# Patient Record
(demographics unavailable — no encounter records)

---

## 2024-10-07 NOTE — PHYSICAL EXAM
[FreeTextEntry1] : Gen: Patient is A&O x 3, NAD HEENT: EOMI, hearing grossly normal Resp: regular, non - labored CV: pulses regular Skin: no rashes, erythema Lymph: +Non-pitting edema in RUE and chest wall, no edema in LUE  Inspection: no instability or misalignment, impaired hand dexterity ROM: full throughout Palpation: TTP bilateral hand A1 pulley digits, triggering noted with finger flexion, TTP right chest wall  Sensation: decreased to light touch in bilateral hands and feet Reflexes: 2+ throughout  Strength: 4/5 strength throughout Special tests: -Ibanez sign Gait: difficulty walking tandem, antalgic, small step length, difficulty with tandem gait

## 2024-10-07 NOTE — HISTORY OF PRESENT ILLNESS
[FreeTextEntry1] : Ms. Wayne is a 60 year old female with history of bilateral breast cancer-right Stage IIB, ER positive/MA positive/HER-2 negative/left Stage IIA ER positive/MA negative/HER-2 positive.  She underwent bilateral mastectomies in October 2020 (1/6 sentinal notes on Right, 0/3 sentinel nodes on left)  S/P AC x 4, with Taxol course complicated by hypersensitivity reaction and uncontrolled DM exacerbated by steroids. S/P Abraxane completed in June 2021. S/P adjuvant radiation to right chest wall and nodes completed September 2021. Continuing with Herceptin/Perjeta, currently on exemestane.  Stopped anticoagulation.     She reports still having joint pains of her body increase duloxetine has helped with this no major side effects.  Continues on gabapentin for chest wall pain and neuropathy symptoms.  Working with physical therapy which has been helpful feels like her lymphedema is improving less tightness in her chest wall.  No new focal weakness.  No side effects to current medications.

## 2024-10-07 NOTE — REVIEW OF SYSTEMS
[Negative] : Psychiatric [FreeTextEntry9] : +Joint pain, +Hand pain, +Right knee pain, +Trigger finger  [de-identified] : +Neuropathy

## 2024-10-07 NOTE — ASSESSMENT
[FreeTextEntry1] : 60 year old female presenting for evaluation.  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, with underlying median neuropathy at the wrist as well -Continue gabapentin 1200mg TID-rx sent  -Medical cannabis previously ineffective   #Aromatase inhibitor induced arthralgias: -Presenting with joint pains in hands and trigger finger -Increase duloxetine to 90mg daily-Rx sent   #Lymphedema in RUE -US RUE reviewed  -Continue pneumatic compression pump daily -Unable to perform bioimpedance spectroscopy of LUE due to bilateral risk, clinically monitor  -Continue MLD -Educated about lymphedema   #Post-mastectomy pain: -Continue PT   #Back pain: -Following with Neurosurgery, surveillance MRIs   Follow up in 3 months or sooner as needed.

## 2024-11-19 NOTE — REVIEW OF SYSTEMS
[Fatigue] : fatigue [Joint Pain] : joint pain [Joint Stiffness] : joint stiffness [Difficulty Walking] : difficulty walking [Negative] : Respiratory [All other systems negative] : All other systems negative [FreeTextEntry9] : knee pain b/l

## 2024-11-19 NOTE — PHYSICAL EXAM
[Alert] : alert [No Proptosis] : no proptosis [No Lid Lag] : no lid lag [No Respiratory Distress] : no respiratory distress [No Accessory Muscle Use] : no accessory muscle use [Clear to Auscultation] : lungs were clear to auscultation bilaterally [Normal Rate] : heart rate was normal [Regular Rhythm] : with a regular rhythm [No Spinal Tenderness] : no spinal tenderness [No Stigmata of Cushings Syndrome] : no stigmata of Cushings Syndrome [Normal Gait] : normal gait [No Clubbing, Cyanosis] : no clubbing  or cyanosis of the fingernails [Abdominal Striae] : no abdominal striae [Acanthosis Nigricans] : no acanthosis nigricans [Foot Ulcers] : no foot ulcers [Acne] : no acne [Hirsutism] : no hirsutism [de-identified] : BMI 60 --> 56 [de-identified] : +difficult to assess thyroid gland [de-identified] : b/l mastectomy.  [de-identified] : small scabbed lesions, pt notes she picked at them.  [de-identified] : +stasis dermatitis changes on anterior shins of b/l LE

## 2024-11-19 NOTE — HISTORY OF PRESENT ILLNESS
[FreeTextEntry1] : Ms. Wayne is presenting for follow up for her diabetes.  Dr. Ariana Díaz, PCP  60 year old female with PMH of infiltrating ductal carcinoma, poorly differentiated ER+, AR negative, HER-2 positive in left breast, infiltrating mammary carcinoma, moderately differentiated with mixed ductal and lobular morphologic features-ER positive, AR positive, HER-2 negative in right breast s/p bilateral mastectomies/axillary lymph node dissections which showed invasive carcinoma in b/l breasts on Oct 2020. She began AC-THP chemotherapy regimen in Dec 2020, had hypersensitivity to taxol. She also has lung blood clots, on eloquis, hypothyroidism, low b12. She has had diabetes for 5-6 years with A1c <7%. She subsequently developed steroid induced hyperglycemia.  POCT HbA1c%: 6.6% Nov 2022 --> 6.9% March 2023 --> 6.8% June 2023 --> 8.0% Nov 2023 --> 6.3% Aug 2024 --> Nov 2024 (canceled)  Current DM meds: Mounjaro 10mg  Prior DM meds: metformin 500mg QD and saxagliptan 5mg QD (onglyza), Ozempic 2.0mg weekly Pertinent: meds: Chemo agents: Herceptin/Perjeta, Abraxane (12 weeks total), extemestane.  FSG: Checks 1-2x Pre-Breakfast: 120s to 130s. Pre-Lunch: Pre-Dinner: Bedtime: Hypoglycemic episodes: none  Diet: Breakfast: cheerios Lunch: Sandwichs with roast beef on roll, taco bell or pizza Dinner: pork chops, chicken cutlets breaded and fried, macaroni with veggies, chicken soup with pasta. Snacks: Fruits apples, bananas or oranges. Pretzels or chips. Denies soda or juice, drinks only seltzer.  Exercise: none Urine micro: Wnl Nov 2022 Lipid profile: TGs 117, LDL 56 March 2023 Statin: Lipitor 10mg HbA1c%: 6.3% Ophthalmology: Jan 2023 No DR Neuropathy: None Podiatry/Diabetic foot exam: 2023  #Hypothyroidism Diagnosed around 2010 LT4 112mcg QD  Aug 2024 TSH 4.17  #Osteoporosis Pt is on Exemestane 25mg QD for 3 out of 7 years total. Pt has h/o breast cancer s/p double mastectomy, chemo and 1 month of radiation.  Fracture history: None Family history: No parental history of hip fracture. Mother with arthritis s/p hip replacement. Prior Treatment: None Current treatment: Reclast - first dose received end of Jan 2024. Tolerated well.  Prior HRT: contraindicated. Allergies: sulfur  Bone History Menopause: Age DEXA Aug 2021 Wnl, no OP. DEXA Sept 2023: T-scores of Spine -2.9, Left hip -1.2, Total hip 0.8.  Falls: No Height loss: Minimal <1 inch. Kidney stones: No Dental health: Regular appointments, no history of implants, no upcoming procedures planned. Possible cavity. Exercise: Dairy intake: Minimal. Calcium supplements: 1200mg daily. Multivitamin: Yes Vitamin D supplements: Mixed in with  Osteoporosis risk factors include: Postmenopausal status,  race, prior fracture, falls, height loss, small thin bones, tobacco use, malignancy, radiation treatment, excessive alcohol, anorexia, family history, vitamin D deficiency, long term corticosteroid use (>3 months), seizure medications (ie phenytoin), prolonged amenorrhea, eating disorders, malabsorption, hyperparathyroidism, hyperthyroidism. NEGATIVE EXCEPT: Postmenopausal status,  race, malignancy, radiation treatment.  Interval hx: Pt on Mounjaro 10mg. Does not have bowl movement every day. Takes oral fiber daily. Now not drinking enough water due to her father in [facility].  Pt weighed 356lbs --> 320 lbs (weighed in office) Reclast - first dose received Jan 2024, tolerated well.  Above information updated as needed.

## 2024-11-19 NOTE — HISTORY OF PRESENT ILLNESS
[FreeTextEntry1] : Ms. Wayne is presenting for follow up for her diabetes.  Dr. Ariana Díaz, PCP  60 year old female with PMH of infiltrating ductal carcinoma, poorly differentiated ER+, NH negative, HER-2 positive in left breast, infiltrating mammary carcinoma, moderately differentiated with mixed ductal and lobular morphologic features-ER positive, NH positive, HER-2 negative in right breast s/p bilateral mastectomies/axillary lymph node dissections which showed invasive carcinoma in b/l breasts on Oct 2020. She began AC-THP chemotherapy regimen in Dec 2020, had hypersensitivity to taxol. She also has lung blood clots, on eloquis, hypothyroidism, low b12. She has had diabetes for 5-6 years with A1c <7%. She subsequently developed steroid induced hyperglycemia.  POCT HbA1c%: 6.6% Nov 2022 --> 6.9% March 2023 --> 6.8% June 2023 --> 8.0% Nov 2023 --> 6.3% Aug 2024 --> Nov 2024 (canceled)  Current DM meds: Mounjaro 10mg  Prior DM meds: metformin 500mg QD and saxagliptan 5mg QD (onglyza), Ozempic 2.0mg weekly Pertinent: meds: Chemo agents: Herceptin/Perjeta, Abraxane (12 weeks total), extemestane.  FSG: Checks 1-2x Pre-Breakfast: 120s to 130s. Pre-Lunch: Pre-Dinner: Bedtime: Hypoglycemic episodes: none  Diet: Breakfast: cheerios Lunch: Sandwichs with roast beef on roll, taco bell or pizza Dinner: pork chops, chicken cutlets breaded and fried, macaroni with veggies, chicken soup with pasta. Snacks: Fruits apples, bananas or oranges. Pretzels or chips. Denies soda or juice, drinks only seltzer.  Exercise: none Urine micro: Wnl Nov 2022 Lipid profile: TGs 117, LDL 56 March 2023 Statin: Lipitor 10mg HbA1c%: 6.3% Ophthalmology: Jan 2023 No DR Neuropathy: None Podiatry/Diabetic foot exam: 2023  #Hypothyroidism Diagnosed around 2010 LT4 112mcg QD  Aug 2024 TSH 4.17  #Osteoporosis Pt is on Exemestane 25mg QD for 3 out of 7 years total. Pt has h/o breast cancer s/p double mastectomy, chemo and 1 month of radiation.  Fracture history: None Family history: No parental history of hip fracture. Mother with arthritis s/p hip replacement. Prior Treatment: None Current treatment: Reclast - first dose received end of Jan 2024. Tolerated well.  Prior HRT: contraindicated. Allergies: sulfur  Bone History Menopause: Age DEXA Aug 2021 Wnl, no OP. DEXA Sept 2023: T-scores of Spine -2.9, Left hip -1.2, Total hip 0.8.  Falls: No Height loss: Minimal <1 inch. Kidney stones: No Dental health: Regular appointments, no history of implants, no upcoming procedures planned. Possible cavity. Exercise: Dairy intake: Minimal. Calcium supplements: 1200mg daily. Multivitamin: Yes Vitamin D supplements: Mixed in with  Osteoporosis risk factors include: Postmenopausal status,  race, prior fracture, falls, height loss, small thin bones, tobacco use, malignancy, radiation treatment, excessive alcohol, anorexia, family history, vitamin D deficiency, long term corticosteroid use (>3 months), seizure medications (ie phenytoin), prolonged amenorrhea, eating disorders, malabsorption, hyperparathyroidism, hyperthyroidism. NEGATIVE EXCEPT: Postmenopausal status,  race, malignancy, radiation treatment.  Interval hx: Pt on Mounjaro 10mg. Does not have bowl movement every day. Takes oral fiber daily. Now not drinking enough water due to her father in [facility].  Pt weighed 356lbs --> 320 lbs (weighed in office) Reclast - first dose received Jan 2024, tolerated well.  Above information updated as needed.

## 2024-11-19 NOTE — REASON FOR VISIT
[Follow - Up] : a follow-up visit [DM Type 2] : DM Type 2 [Hypothyroidism] : hypothyroidism [Osteoporosis] : osteoporosis [FreeTextEntry2] : Dr. Ariana Díaz, PCP, [facility]

## 2024-11-19 NOTE — PHYSICAL EXAM
[Alert] : alert [No Proptosis] : no proptosis [No Lid Lag] : no lid lag [No Respiratory Distress] : no respiratory distress [No Accessory Muscle Use] : no accessory muscle use [Clear to Auscultation] : lungs were clear to auscultation bilaterally [Normal Rate] : heart rate was normal [Regular Rhythm] : with a regular rhythm [No Spinal Tenderness] : no spinal tenderness [No Stigmata of Cushings Syndrome] : no stigmata of Cushings Syndrome [Normal Gait] : normal gait [No Clubbing, Cyanosis] : no clubbing  or cyanosis of the fingernails [Abdominal Striae] : no abdominal striae [Acanthosis Nigricans] : no acanthosis nigricans [Foot Ulcers] : no foot ulcers [Acne] : no acne [Hirsutism] : no hirsutism [de-identified] : BMI 60 --> 56 [de-identified] : +difficult to assess thyroid gland [de-identified] : b/l mastectomy.  [de-identified] : small scabbed lesions, pt notes she picked at them.  [de-identified] : +stasis dermatitis changes on anterior shins of b/l LE

## 2024-11-19 NOTE — ASSESSMENT
[Diabetes Foot Care] : diabetes foot care [Carbohydrate Consistent Diet] : carbohydrate consistent diet [Importance of Diet and Exercise] : importance of diet and exercise to improve glycemic control, achieve weight loss and improve cardiovascular health [Exercise/Effect on Glucose] : exercise/effect on glucose [Action and use of Insulin] : action and use of short and long-acting insulin [Self Monitoring of Blood Glucose] : self monitoring of blood glucose [Levothyroxine] : The patient was instructed to take Levothyroxine on an empty stomach, separate from vitamins, and wait at least 30 minutes before eating [FreeTextEntry1] : 60 year old female with medical history of b/l breast cancer currently undergoing treatment, lung blood clots (on eloquis), low b12, HTN, BMI 63 presenting for steroid induced hyperglycemia f/u for diabetes and hypothyroidism.  1. Type 2 Diabetes, HbA1c 6.6% Nov 2022 --> 6.9% March 2023 --> 6.8% June 2023 --> 8.0% Nov 2023 --> 6.3% Aug 2024 --> Nov 2024 (canceled) -Continue Mounjaro 10mg weekly for 4 weeks, then increase to 12.5mg weekly. Pt denies h/o MTC, pancreatitis, FH of MEN2 syndrome. No h/o MTC, MEN2 or pancreatitis. GI s/e discussed. -Opthal UTD -Microalbumin urine UTD -Continue lipitor 10mg HS -Check SMBG at least 1x daily  2. Osteoporosis She has no history of fragility fracture. Pt received first Reclast dose received Jan 2024.  Calcium 1200 mg daily from diet and supplements (to be taken in divided doses as no more than 500-600 mg can be absorbed at one time); advised increased dietary and/or supplemental calcium Vitamin D supplementation to 2000 intl units daily Diet, weight bearing exercises and fall prevention discussed. Repeat bone density scan Jan 2025 to determine further treatment.   3. Hypothyroidism Continue current LT4 112mcg QD  TSH 4.17 Aug 2024  Patient verbalized understanding of the above. All questions were answered to patient's satisfaction. Dispo: Patient will follow up in 3-4 months.

## 2025-01-06 NOTE — REVIEW OF SYSTEMS
[Negative] : Psychiatric [FreeTextEntry9] : +Joint pain, +Hand pain, +Trigger finger  [de-identified] : +Neuropathy

## 2025-01-06 NOTE — ASSESSMENT
[FreeTextEntry1] : 60 year old female presenting for evaluation.  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, with underlying median neuropathy at the wrist as well -Continue gabapentin 1200mg TID-rx sent  -Medical cannabis previously ineffective   #Aromatase inhibitor induced arthralgias: -Presenting with joint pains in hands and trigger finger -Continue duloxetine 90mg daily-Rx sent   #Right lateral epicondylitis: -Discussed imaging, she would like to defer -Start voltaren gel   #Lymphedema in RUE -Continue pneumatic compression pump daily -Unable to perform bioimpedance spectroscopy of LUE due to bilateral risk, clinically monitor  -Continue self-MLD -Educated about lymphedema   #Back pain: -Following with Neurosurgery, surveillance MRIs   Follow up in 3 months or sooner as needed.

## 2025-01-06 NOTE — HISTORY OF PRESENT ILLNESS
[FreeTextEntry1] : Ms. Wayne is a 60 year old female with history of bilateral breast cancer-right Stage IIB, ER positive/ND positive/HER-2 negative/left Stage IIA ER positive/ND negative/HER-2 positive.  She underwent bilateral mastectomies in October 2020 (1/6 sentinal notes on Right, 0/3 sentinel nodes on left)  S/P AC x 4, with Taxol course complicated by hypersensitivity reaction and uncontrolled DM exacerbated by steroids. S/P Abraxane completed in June 2021. S/P adjuvant radiation to right chest wall and nodes completed September 2021. Continuing with Herceptin/Perjeta, currently on exemestane.  Stopped anticoagulation.    She reports that increasing her duloxetine has been helpful.  She states that the neuropathy symptoms in her feet especially the discomfort has improved.  Still feels numbness.  Still feels joint pains especially in her hands as well as neuropathy symptoms continues on gabapentin.  No new focal weakness.  Reports lymphedema is been stable.  Reports pain in her lateral epicondyle of her right arm.  No recent trauma.  No erythema or increased warmth.

## 2025-01-06 NOTE — PHYSICAL EXAM
[FreeTextEntry1] : Gen: Patient is A&O x 3, NAD HEENT: EOMI, hearing grossly normal Resp: regular, non - labored CV: pulses regular Skin: no rashes, erythema Lymph: +Non-pitting edema in RUE and chest wall, no edema in LUE  Inspection: no instability or misalignment, impaired hand dexterity ROM: full throughout Palpation: TTP bilateral hand A1 pulley digits, triggering noted with finger flexion, TTP right chest wall, TTP right lateral epicondule  Sensation: decreased to light touch in bilateral hands and feet Reflexes: 2+ throughout  Strength: 4/5 strength throughout Special tests: -Ibanez sign, +Right cozens sign  Gait: difficulty walking tandem, antalgic, small step length, difficulty with tandem gait

## 2025-03-11 NOTE — PHYSICAL EXAM
[Restricted in physically strenuous activity but ambulatory and able to carry out work of a light or sedentary nature] : Status 1- Restricted in physically strenuous activity but ambulatory and able to carry out work of a light or sedentary nature, e.g., light house work, office work [Obese] : obese [Normal] : affect appropriate [de-identified] : non-toxic appearing [de-identified] : chronic venous stasis changes LE; no calf tenderness [de-identified] : no CW nodularity appreciated [de-identified] : no axillary LN's palpable [de-identified] : scattered healed skin lesion; no purulence or vesicles [de-identified] : alert and oriented x 3

## 2025-03-11 NOTE — ASSESSMENT
[FreeTextEntry1] : Lab work reviewed. #1) Patient with bilateral breast cancer-right Stage IIB, ER positive/MS positive/HER-2 negative/left Stage IIA ER positive/MS negative/HER-2 positive.  10/22/2020-Patient underwent bilateral mastectomies/axillary lymph node dissections. 12/21/20-Began AC-THP chemotherapy regimen. +Hypersensitivity reaction to taxol, uncontrolled DM, so changed to Abraxane for taxane portion of treatment regimen-completing 6/30/21-->RT completed 9/1/21-->Anastrozole begun 9/2021-discontinued 12/2021 due to severe arthralgias, and began Exemestane-appears to be tolerating thus far. 2/2/22-Completed last Herceptin/Perjeta dose (received 1 year). Patient sees cardiologist (Dr. Bakari Johnson) for cardiac monitoring. --clinically ARLENE  #2) [id number]. --patient under care of endocrine MD for this-receiving reclast.  #3) h/o pulmonary embolism. Patient received anticoagulation for ~12 months.  Initial event ?secondary to body habitus and relative immobility at that time, and was receiving chemotherapy.  Lower extremity venous duplex study 12/2021 unremarkable, as was f/u CTA chest 1/2022-anticoagulation discontinued. --follow clinically  #4) vascular access-had d/w patient having port removed. Patient wishes for it to remain in at this time. She is agreeable to its maintenance.  #5) potassium supplement as needed for h/o low potassium level.  Patient was given the opportunity to ask questions.  Her questions have been answered to her apparent satisfaction.    -->Exemestane 25 mg PO daily; POF/labs, POF Q 2 months. MD visit 6 months.

## 2025-03-11 NOTE — CONSULT LETTER
[Dear  ___] : Dear  [unfilled], [Courtesy Letter:] : I had the pleasure of seeing your patient, [unfilled], in my office today. [Please see my note below.] : Please see my note below. [Consult Closing:] : Thank you very much for allowing me to participate in the care of this patient.  If you have any questions, please do not hesitate to contact me. [Sincerely,] : Sincerely, [FreeTextEntry3] : Kathrine Fountain MD

## 2025-03-11 NOTE — PHYSICAL EXAM
[Restricted in physically strenuous activity but ambulatory and able to carry out work of a light or sedentary nature] : Status 1- Restricted in physically strenuous activity but ambulatory and able to carry out work of a light or sedentary nature, e.g., light house work, office work [Obese] : obese [Normal] : affect appropriate [de-identified] : non-toxic appearing [de-identified] : chronic venous stasis changes LE; no calf tenderness [de-identified] : no CW nodularity appreciated [de-identified] : no axillary LN's palpable [de-identified] : scattered healed skin lesion; no purulence or vesicles [de-identified] : alert and oriented x 3

## 2025-03-11 NOTE — REVIEW OF SYSTEMS
[SOB on Exertion] : shortness of breath during exertion [Joint Pain] : joint pain [Negative] : Allergic/Immunologic [Fainting] : no fainting

## 2025-03-11 NOTE — HISTORY OF PRESENT ILLNESS
[Disease: _____________________] : Disease: [unfilled] [de-identified] : 7/2020-Patient presented with a couple of month history of feeling an "abnormality" in her left breast.  Her last mammogram had been in 2018.  7/29/2020-Mammogram revealed 2 adjacent masses in the left breast at the site of palpable concern.  A 1.2 cm hypoechoic area with distortion in the right breast.   8/7/2020-Ultrasound-guided left breast biopsy revealed infiltrating ductal carcinoma, poorly differentiated-ER +51 to 60% moderate intensity of staining, PA negative, HER-2 positive (3+).  Ultrasound-guided right breast biopsy showed infiltrating mammary carcinoma, moderately differentiated with mixed ductal and lobular morphologic features-ER positive (91 to 100% strong intensity of staining), PA positive (91 to 100% strong intensity of staining), HER-2 negative (1+).  10/22/2020-Patient underwent bilateral mastectomies/axillary lymph node dissections with pathology as follows: Right breast-2.5 cm invasive carcinoma with ductal and lobular features.  1/36 lymph nodes positive for macro metastasis (no extranodal extension).  Margins negative.  No skin or nipple involvement. Left breast-4 cm invasive ductal carcinoma (with DCIS as well).  Margins negative.  No skin or nipple involvement.  3 sentinel lymph nodes negative.  12/21/20-Began AC-THP chemotherapy regimen. +Hypersensitivity reaction to taxol, uncontrolled DM, so changed to Abraxane for taxane portion of treatment regimen-completing 6/30/21-->RT completed 9/1/21-->Anastrozole begun 9/2021-discontinued 12/2021 due to severe arthralgias, and began Exemestane. 2/2/22-Completed last Herceptin/Perjeta dose (received 1 year).      [de-identified] : Right-pT2N1a (Stage IIB)-ductal and lobular features/Left-pT2N0 (Stage IIA)-invasive ductal carcinoma [de-identified] : [employer] genetic testing negative (9 genes)-included BRCA1/BRCA2.\par  Right breast cancer-ER+/MN+/Her 2-.\par  Left breast cancer-ER+/MN-/Her2+.\par  \par  2/2021-PE-treated with LMWH-->Eliquis x ~1 year. [de-identified] : 97 yo father passed away a few weeks ago-condolences offered-patient coping.. Gets reclast from endocrinologist for osteoporosis. Taking moungero. No c/o vaginal bleeding. No c/o CP or palpitations. No current SOB.  No c/o fevers, chills, night sweats, cough, bleeding. No abdominal/pelvic pain. No c/o H/A. Bought a condo in [place]-plans to spend some of summer there.  Sister Amisha present.

## 2025-03-11 NOTE — ASSESSMENT
[FreeTextEntry1] : Lab work reviewed. #1) Patient with bilateral breast cancer-right Stage IIB, ER positive/NJ positive/HER-2 negative/left Stage IIA ER positive/NJ negative/HER-2 positive.  10/22/2020-Patient underwent bilateral mastectomies/axillary lymph node dissections. 12/21/20-Began AC-THP chemotherapy regimen. +Hypersensitivity reaction to taxol, uncontrolled DM, so changed to Abraxane for taxane portion of treatment regimen-completing 6/30/21-->RT completed 9/1/21-->Anastrozole begun 9/2021-discontinued 12/2021 due to severe arthralgias, and began Exemestane-appears to be tolerating thus far. 2/2/22-Completed last Herceptin/Perjeta dose (received 1 year). Patient sees cardiologist (Dr. Bakari Johnson) for cardiac monitoring. --clinically ARLENE  #2) [id number]. --patient under care of endocrine MD for this-receiving reclast.  #3) h/o pulmonary embolism. Patient received anticoagulation for ~12 months.  Initial event ?secondary to body habitus and relative immobility at that time, and was receiving chemotherapy.  Lower extremity venous duplex study 12/2021 unremarkable, as was f/u CTA chest 1/2022-anticoagulation discontinued. --follow clinically  #4) vascular access-had d/w patient having port removed. Patient wishes for it to remain in at this time. She is agreeable to its maintenance.  #5) potassium supplement as needed for h/o low potassium level.  Patient was given the opportunity to ask questions.  Her questions have been answered to her apparent satisfaction.    -->Exemestane 25 mg PO daily; POF/labs, POF Q 2 months. MD visit 6 months.

## 2025-03-11 NOTE — HISTORY OF PRESENT ILLNESS
[Disease: _____________________] : Disease: [unfilled] [de-identified] : 7/2020-Patient presented with a couple of month history of feeling an "abnormality" in her left breast.  Her last mammogram had been in 2018.  7/29/2020-Mammogram revealed 2 adjacent masses in the left breast at the site of palpable concern.  A 1.2 cm hypoechoic area with distortion in the right breast.   8/7/2020-Ultrasound-guided left breast biopsy revealed infiltrating ductal carcinoma, poorly differentiated-ER +51 to 60% moderate intensity of staining, DE negative, HER-2 positive (3+).  Ultrasound-guided right breast biopsy showed infiltrating mammary carcinoma, moderately differentiated with mixed ductal and lobular morphologic features-ER positive (91 to 100% strong intensity of staining), DE positive (91 to 100% strong intensity of staining), HER-2 negative (1+).  10/22/2020-Patient underwent bilateral mastectomies/axillary lymph node dissections with pathology as follows: Right breast-2.5 cm invasive carcinoma with ductal and lobular features.  1/36 lymph nodes positive for macro metastasis (no extranodal extension).  Margins negative.  No skin or nipple involvement. Left breast-4 cm invasive ductal carcinoma (with DCIS as well).  Margins negative.  No skin or nipple involvement.  3 sentinel lymph nodes negative.  12/21/20-Began AC-THP chemotherapy regimen. +Hypersensitivity reaction to taxol, uncontrolled DM, so changed to Abraxane for taxane portion of treatment regimen-completing 6/30/21-->RT completed 9/1/21-->Anastrozole begun 9/2021-discontinued 12/2021 due to severe arthralgias, and began Exemestane. 2/2/22-Completed last Herceptin/Perjeta dose (received 1 year).      [de-identified] : Right-pT2N1a (Stage IIB)-ductal and lobular features/Left-pT2N0 (Stage IIA)-invasive ductal carcinoma [de-identified] : [employer] genetic testing negative (9 genes)-included BRCA1/BRCA2.\par  Right breast cancer-ER+/MI+/Her 2-.\par  Left breast cancer-ER+/MI-/Her2+.\par  \par  2/2021-PE-treated with LMWH-->Eliquis x ~1 year. [de-identified] : 97 yo father passed away a few weeks ago-condolences offered-patient coping.. Gets reclast from endocrinologist for osteoporosis. Taking moungero. No c/o vaginal bleeding. No c/o CP or palpitations. No current SOB.  No c/o fevers, chills, night sweats, cough, bleeding. No abdominal/pelvic pain. No c/o H/A. Bought a condo in [place]-plans to spend some of summer there.  Sister Amisha present.

## 2025-03-11 NOTE — HISTORY OF PRESENT ILLNESS
[Disease: _____________________] : Disease: [unfilled] [de-identified] : 7/2020-Patient presented with a couple of month history of feeling an "abnormality" in her left breast.  Her last mammogram had been in 2018.  7/29/2020-Mammogram revealed 2 adjacent masses in the left breast at the site of palpable concern.  A 1.2 cm hypoechoic area with distortion in the right breast.   8/7/2020-Ultrasound-guided left breast biopsy revealed infiltrating ductal carcinoma, poorly differentiated-ER +51 to 60% moderate intensity of staining, CO negative, HER-2 positive (3+).  Ultrasound-guided right breast biopsy showed infiltrating mammary carcinoma, moderately differentiated with mixed ductal and lobular morphologic features-ER positive (91 to 100% strong intensity of staining), CO positive (91 to 100% strong intensity of staining), HER-2 negative (1+).  10/22/2020-Patient underwent bilateral mastectomies/axillary lymph node dissections with pathology as follows: Right breast-2.5 cm invasive carcinoma with ductal and lobular features.  1/36 lymph nodes positive for macro metastasis (no extranodal extension).  Margins negative.  No skin or nipple involvement. Left breast-4 cm invasive ductal carcinoma (with DCIS as well).  Margins negative.  No skin or nipple involvement.  3 sentinel lymph nodes negative.  12/21/20-Began AC-THP chemotherapy regimen. +Hypersensitivity reaction to taxol, uncontrolled DM, so changed to Abraxane for taxane portion of treatment regimen-completing 6/30/21-->RT completed 9/1/21-->Anastrozole begun 9/2021-discontinued 12/2021 due to severe arthralgias, and began Exemestane. 2/2/22-Completed last Herceptin/Perjeta dose (received 1 year).      [de-identified] : Right-pT2N1a (Stage IIB)-ductal and lobular features/Left-pT2N0 (Stage IIA)-invasive ductal carcinoma [de-identified] : [employer] genetic testing negative (9 genes)-included BRCA1/BRCA2.\par  Right breast cancer-ER+/MO+/Her 2-.\par  Left breast cancer-ER+/MO-/Her2+.\par  \par  2/2021-PE-treated with LMWH-->Eliquis x ~1 year. [de-identified] : 97 yo father passed away a few weeks ago-condolences offered-patient coping.. Gets reclast from endocrinologist for osteoporosis. Taking moungero. No c/o vaginal bleeding. No c/o CP or palpitations. No current SOB.  No c/o fevers, chills, night sweats, cough, bleeding. No abdominal/pelvic pain. No c/o H/A. Bought a condo in [place]-plans to spend some of summer there.  Sister Amisha present.

## 2025-03-11 NOTE — ASSESSMENT
[FreeTextEntry1] : Lab work reviewed. #1) Patient with bilateral breast cancer-right Stage IIB, ER positive/OH positive/HER-2 negative/left Stage IIA ER positive/OH negative/HER-2 positive.  10/22/2020-Patient underwent bilateral mastectomies/axillary lymph node dissections. 12/21/20-Began AC-THP chemotherapy regimen. +Hypersensitivity reaction to taxol, uncontrolled DM, so changed to Abraxane for taxane portion of treatment regimen-completing 6/30/21-->RT completed 9/1/21-->Anastrozole begun 9/2021-discontinued 12/2021 due to severe arthralgias, and began Exemestane-appears to be tolerating thus far. 2/2/22-Completed last Herceptin/Perjeta dose (received 1 year). Patient sees cardiologist (Dr. Bakari Johnson) for cardiac monitoring. --clinically ARLENE  #2) [id number]. --patient under care of endocrine MD for this-receiving reclast.  #3) h/o pulmonary embolism. Patient received anticoagulation for ~12 months.  Initial event ?secondary to body habitus and relative immobility at that time, and was receiving chemotherapy.  Lower extremity venous duplex study 12/2021 unremarkable, as was f/u CTA chest 1/2022-anticoagulation discontinued. --follow clinically  #4) vascular access-had d/w patient having port removed. Patient wishes for it to remain in at this time. She is agreeable to its maintenance.  #5) potassium supplement as needed for h/o low potassium level.  Patient was given the opportunity to ask questions.  Her questions have been answered to her apparent satisfaction.    -->Exemestane 25 mg PO daily; POF/labs, POF Q 2 months. MD visit 6 months.

## 2025-03-11 NOTE — PHYSICAL EXAM
[Restricted in physically strenuous activity but ambulatory and able to carry out work of a light or sedentary nature] : Status 1- Restricted in physically strenuous activity but ambulatory and able to carry out work of a light or sedentary nature, e.g., light house work, office work [Obese] : obese [Normal] : affect appropriate [de-identified] : non-toxic appearing [de-identified] : chronic venous stasis changes LE; no calf tenderness [de-identified] : no CW nodularity appreciated [de-identified] : no axillary LN's palpable [de-identified] : scattered healed skin lesion; no purulence or vesicles [de-identified] : alert and oriented x 3

## 2025-03-14 NOTE — REASON FOR VISIT
[Follow - Up] : a follow-up visit [DM Type 2] : DM Type 2 [Hypothyroidism] : hypothyroidism [Osteoporosis] : osteoporosis [Family Member] : family member [FreeTextEntry2] : Dr. Ariana Díaz, PCP, [facility]

## 2025-03-14 NOTE — HISTORY OF PRESENT ILLNESS
[FreeTextEntry1] : Ms. Wayne is presenting for follow up for her diabetes, osteoporosis.  Dr. Ariana Díaz, PCP  60 year old female with PMH of infiltrating ductal carcinoma, poorly differentiated ER+, NV negative, HER-2 positive in left breast, infiltrating mammary carcinoma, moderately differentiated with mixed ductal and lobular morphologic features-ER positive, NV positive, HER-2 negative in right breast s/p bilateral mastectomies/axillary lymph node dissections which showed invasive carcinoma in b/l breasts on Oct 2020. She began AC-THP chemotherapy regimen in Dec 2020, had hypersensitivity to taxol. She also has lung blood clots, on eloquis, hypothyroidism, low b12. She has had diabetes for 5-6 years with A1c <7%. She subsequently developed steroid induced hyperglycemia.  POCT HbA1c%: 6.9% March 2023 --> 6.8% June 2023 --> 8.0% Nov 2023 --> 6.3% Aug 2024 --> 6.2% Feb 2025  Current DM meds: Mounjaro 12.5mg (tolerating well) Prior DM meds: metformin 500mg QD and saxagliptan 5mg QD (onglyza), Ozempic 2.0mg weekly Pertinent: meds: Chemo agents: Herceptin/Perjeta, Abraxane (12 weeks total), extemestane.  FSG: Does not check  Diet: Breakfast: cheerios Lunch: Sandwichs with roast beef on roll, taco bell or pizza Dinner: pork chops, chicken cutlets breaded and fried, macaroni with veggies, chicken soup with pasta. Snacks: Fruits apples, bananas or oranges. Pretzels or chips. Denies soda or juice, drinks only seltzer.  Exercise: none Urine micro: Wnl Nov 2022 Lipid profile: TGs 117, LDL 56 March 2023 Statin: Lipitor 10mg HbA1c%: 6.3% Ophthalmology: Jan 2023 No  Neuropathy: None Podiatry/Diabetic foot exam: 2023  #Hypothyroidism Diagnosed around 2010 LT4 112mcg QD  Aug 2024 TSH 4.17  #Osteoporosis Pt is on Exemestane 25mg QD for 3 out of 7 years total. Pt has h/o breast cancer s/p double mastectomy, chemo and 1 month of radiation.  Fracture history: None Family history: No parental history of hip fracture. Mother with arthritis s/p hip replacement. Prior Treatment: None Current treatment: Reclast - first dose received end of Jan 2024. Tolerated well.  Prior HRT: contraindicated. Allergies: sulfur  Bone History Menopause: Age DEXA Aug 2021 Wnl, no OP. DEXA Sept 2023: T-scores of Spine -2.9, -1.2 femoral neck, Left hip -1.2, Total hip 0.8. DEXA Feb 2025: T-scores of Spine -0.9, -1.7 femoral neck, Total hip 0.6, -0.6 Left forearm. FRAX 10 year fracture risk calculator the patient's ten-year risk of any fracture is 11% and the patient's risk of hip fracture  is  1.1%  Falls: No Height loss: Minimal <1 inch. Kidney stones: No Dental health: Regular appointments, no history of implants, no upcoming procedures planned. Possible cavity. Exercise: Minimal  Dairy intake: Minimal. Calcium supplements: 1200mg daily. Multivitamin: Yes Vitamin D supplements: Mixed in with  Osteoporosis risk factors include: Postmenopausal status,  race, prior fracture, falls, height loss, small thin bones, tobacco use, malignancy, radiation treatment, excessive alcohol, anorexia, family history, vitamin D deficiency, long term corticosteroid use (>3 months), seizure medications (ie phenytoin), prolonged amenorrhea, eating disorders, malabsorption, hyperparathyroidism, hyperthyroidism. NEGATIVE EXCEPT: Postmenopausal status,  race, malignancy, radiation treatment.  Interval hx: Pt on Mounjaro 12.5mg. Now has bowl movement every day. Takes oral fiber daily. Pt weighed 356lbs --> 320 lbs --> 310lbs (weighed in office) Bone density reviewed - now osteopenia after 1 dose of reclast.  Above information updated as needed.

## 2025-03-14 NOTE — ASSESSMENT
[Diabetes Foot Care] : diabetes foot care [Carbohydrate Consistent Diet] : carbohydrate consistent diet [Importance of Diet and Exercise] : importance of diet and exercise to improve glycemic control, achieve weight loss and improve cardiovascular health [Exercise/Effect on Glucose] : exercise/effect on glucose [Action and use of Insulin] : action and use of short and long-acting insulin [Self Monitoring of Blood Glucose] : self monitoring of blood glucose [Levothyroxine] : The patient was instructed to take Levothyroxine on an empty stomach, separate from vitamins, and wait at least 30 minutes before eating [FreeTextEntry1] : 60 year old female with medical history of b/l breast cancer currently undergoing treatment, lung blood clots (on eloquis), low b12, HTN, BMI 63 presenting for steroid induced hyperglycemia f/u for diabetes and hypothyroidism.  1. Type 2 Diabetes, HbA1c 6.6% Nov 2022 --> 6.9% March 2023 --> 6.8% June 2023 --> 8.0% Nov 2023 --> 6.3% Aug 2024 --> 6.2% Feb 2025  -Continue Mounjaro 12.5mg weekly. Pt denies h/o MTC, pancreatitis, FH of MEN2 syndrome. No h/o MTC, MEN2 or pancreatitis. GI s/e discussed. -Opthal UTD -Microalbumin urine Needs  -Continue lipitor 10mg HS -Check SMBG at least 1x daily  2. Osteoporosis She has no history of fragility fracture. Pt received first Reclast dose Jan 2024.  Calcium 1200 mg daily from diet and supplements (to be taken in divided doses as no more than 500-600 mg can be absorbed at one time); advised increased dietary and/or supplemental calcium Vitamin D supplementation to 2000 intl units daily Diet, weight bearing exercises and fall prevention discussed. Bone density scan Jan 2026 to determine further treatment.  Hold second reclast dose for now.   3. Hypothyroidism Continue current LT4 112mcg QD  TSH 4.17 Aug 2024 -Repeat TFTs in 6 months.   Patient verbalized understanding of the above. All questions were answered to patient's satisfaction. Dispo: Patient will follow up in 6 months.

## 2025-03-14 NOTE — PHYSICAL EXAM
[Alert] : alert [No Proptosis] : no proptosis [No Lid Lag] : no lid lag [No Respiratory Distress] : no respiratory distress [No Accessory Muscle Use] : no accessory muscle use [Clear to Auscultation] : lungs were clear to auscultation bilaterally [Normal Rate] : heart rate was normal [Regular Rhythm] : with a regular rhythm [No Spinal Tenderness] : no spinal tenderness [No Stigmata of Cushings Syndrome] : no stigmata of Cushings Syndrome [Normal Gait] : normal gait [No Clubbing, Cyanosis] : no clubbing  or cyanosis of the fingernails [Abdominal Striae] : no abdominal striae [Acanthosis Nigricans] : no acanthosis nigricans [Foot Ulcers] : no foot ulcers [Acne] : no acne [Hirsutism] : no hirsutism [de-identified] : BMI 60 --> 56 [de-identified] : +difficult to assess thyroid gland [de-identified] : b/l mastectomy.  [de-identified] : small scabbed lesions, pt notes she picked at them.  [de-identified] : +stasis dermatitis changes on anterior shins of b/l LE

## 2025-03-21 NOTE — HISTORY OF PRESENT ILLNESS
[de-identified] : This is a 59-year-old morbidly obese female with longstanding bilateral knee pain she has known bone-on-bone medial compartment osteoarthritis of bilateral knees the x-rays worse on the left than right but she has about similar pain in both knees. patient had b/l gel injections one year ago which she did well with. comes in today to discuss repeat HA injections prior to a trip in May  Patient has history of DVT and diabetes

## 2025-03-21 NOTE — DISCUSSION/SUMMARY
[de-identified] : Medication risks reviewed. Surgical risks reviewed. 61 y/o F pt presents with bone on bone medial compartmental osteoarthritis of both knees. The nature of her condition and treatment options were discussed in detail. The pt is a reasonable candidate for a staged bilateral TKA. The pt understands that her A1c needs to be below 8.0 and she needs cardiac clearance. We recommend the pt exhausts conservative therapy treatment such as HA injections, cortisone injections, and antiinflammatories. The pt opted for repeating HA injection I ordered medication patient will return to office when the medication is available. Patient will continue work on weight loss.

## 2025-03-21 NOTE — PHYSICAL EXAM
[de-identified] : GENERAL APPEARANCE: Well nourished and hydrated, pleasant, alert, and oriented x 3. Appears their stated age. HEENT: Normocephalic, extraocular eye motion intact. Nasal septum midline. Oral cavity clear. External auditory canal clear. RESPIRATORY: Breath sounds clear and audible in all lobes. No wheezing, No accessory muscle use. CARDIOVASCULAR: No apparent abnormalities. No lower leg edema. No varicosities. Pedal pulses are palpable. NEUROLOGIC: Sensation is normal, no muscle weakness in the upper or lower extremities. DERMATOLOGIC: No apparent skin lesions, moist, warm, no rash. SPINE: Cervical spine appears normal and moves freely; thoracic spine appears normal and moves freely; lumbosacral spine appears normal and moves freely, normal, nontender. MUSCULOSKELETAL: Hands, wrists, and elbows are normal and move freely, shoulders are normal and move freely. Musculoskeletal 5/5 motor strength in bilateral lower extremities. Sensory: Intact in bilateral lower extremities. DTRs: Biceps, brachioradialis, triceps, patellar, ankle and plantar 2+ and symmetric bilaterally. Pulses: dorsalis pedis, posterior tibial, femoral, popliteal, and radial 2+ and symmetric bilaterally. Constitutional: Alert and in no acute distress, but well-appearing.   Musculoskeletal:. Bilateral knee examination shows medial joint line tenderness range of motion is at 0 to 115 degree.

## 2025-04-14 NOTE — ASSESSMENT
[FreeTextEntry1] : 60 year old female presenting for evaluation.  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, with underlying median neuropathy at the wrist as well -Continue gabapentin 1200mg TID-rx sent  -Medical cannabis previously ineffective   #Aromatase inhibitor induced arthralgias: -Presenting with joint pains in hands and trigger finger -Continue duloxetine 90mg daily-Rx sent   #Right lateral epicondylitis: -Resolved   #Lymphedema in RUE -Continue pneumatic compression pump daily, contacted vendor regarding new supplies  -Unable to perform bioimpedance spectroscopy of LUE due to bilateral risk, clinically monitor  -Continue self-MLD -Educated about lymphedema  -Start custom compression sleeve [id number] sent  Patient requires a custom compression sleeve due to circumference of the proximal portion of limb is significantly greater than the distal limb. Skin/tissue folds or contours require a specific knitting pattern. -Start glove [id number] sent -The patient requires ready to wear compression glove due to her lymphedema to maintain and prevent worsening of this condition.  Lymphedema is a chronic/lifetime condition and the patient will have continued need for these supplies.   #Back pain: -Following with Neurosurgery, surveillance MRIs   Follow up in 3 months or sooner as needed.

## 2025-04-14 NOTE — REVIEW OF SYSTEMS
[Negative] : Psychiatric [FreeTextEntry9] : +Joint pain, +Hand pain, +Trigger finger  [de-identified] : +Neuropathy

## 2025-04-14 NOTE — HISTORY OF PRESENT ILLNESS
[FreeTextEntry1] : Ms. Wayne is a 60 year old female with history of bilateral breast cancer-right Stage IIB, ER positive/MT positive/HER-2 negative/left Stage IIA ER positive/MT negative/HER-2 positive.  She underwent bilateral mastectomies in October 2020 (1/6 sentinal notes on Right, 0/3 sentinel nodes on left)  S/P AC x 4, with Taxol course complicated by hypersensitivity reaction and uncontrolled DM exacerbated by steroids. S/P Abraxane completed in June 2021. S/P adjuvant radiation to right chest wall and nodes completed September 2021. Continuing with Herceptin/Perjeta, currently on exemestane.  Stopped anticoagulation.    She reports she reports that pneumatic compression pump broke so she has been unable to use it.  Denies any worsening pain in her right upper extremity.  Reports elbow pain has resolved.  Still feels significant pain in her joints in her hands.  Still feels tightness in her chest wall.  Still feels clicking in her hands.  Continues with duloxetine and gabapentin which is helpful.  Still has difficulty performing fine motor tasks especially with her hands.

## 2025-04-16 NOTE — REASON FOR VISIT
[Knee Pain] : knee pain [Family Member] : family member [Follow-Up Visit] : a follow-up visit for [FreeTextEntry2] : bilateral knee gelsyn inj#1 lot# [id number] exp 2027/05/31

## 2025-04-16 NOTE — DISCUSSION/SUMMARY
[de-identified] : Medication risks reviewed. Surgical risks reviewed. 59 y/o F pt presents with bone on bone medial compartmental osteoarthritis of both knees. The nature of her condition and treatment options were discussed in detail. The pt is a reasonable candidate for a staged bilateral TKA. The pt understands that her A1c needs to be below 8.0 and she needs cardiac clearance. We recommend the pt exhausts conservative therapy treatment such as HA injections, cortisone injections, and antiinflammatories. she opted for gelsyn bialteral knee injection #1 toady which she tolerated well. joey tompkins one week

## 2025-04-16 NOTE — DISCUSSION/SUMMARY
[de-identified] : Medication risks reviewed. Surgical risks reviewed. 61 y/o F pt presents with bone on bone medial compartmental osteoarthritis of both knees. The nature of her condition and treatment options were discussed in detail. The pt is a reasonable candidate for a staged bilateral TKA. The pt understands that her A1c needs to be below 8.0 and she needs cardiac clearance. We recommend the pt exhausts conservative therapy treatment such as HA injections, cortisone injections, and antiinflammatories. she opted for gelsyn bialteral knee injection #1 toady which she tolerated well. joey tompkins one week

## 2025-04-16 NOTE — HISTORY OF PRESENT ILLNESS
[de-identified] : This is a 59-year-old morbidly obese female with longstanding bilateral knee pain she has known bone-on-bone medial compartment osteoarthritis of bilateral knees the x-rays worse on the left than right but she has about similar pain in both knees. patient had b/l gel injections one year ago which she did well with. Patient has history of DVT and diabetes

## 2025-04-16 NOTE — REASON FOR VISIT
[Knee Pain] : knee pain [Family Member] : family member [Follow-Up Visit] : a follow-up visit for [FreeTextEntry2] : bilateral knee gelsyn inj#1 lot# [id number] exp 2027/05/31

## 2025-04-16 NOTE — HISTORY OF PRESENT ILLNESS
[de-identified] : This is a 59-year-old morbidly obese female with longstanding bilateral knee pain she has known bone-on-bone medial compartment osteoarthritis of bilateral knees the x-rays worse on the left than right but she has about similar pain in both knees. patient had b/l gel injections one year ago which she did well with. Patient has history of DVT and diabetes

## 2025-04-16 NOTE — PROCEDURE
[de-identified] :  I injected the patient's bilateral knee gelsyn inj#1 lot# [id number] exp 2027/05/31 Knee injection visco-supplementation: I discussed at length with the patient the planned steroid and lidocaine injection for primary osteoarthritis. The risks, benefits, convalescence and alternatives were reviewed and pt consented for injection. The possible side effects discussed included but were not limited to: pain, swelling, heat and redness. There symptoms are generally mild but if they are extensive then contact the office. Giving pain relievers by mouth such as NSAIDs or Tylenol can generally treat the reactions to injection. Rare cases of infection have been noted. Rash, hives and itching may occur post injection. If you have muscle pain or cramps, flushing and or swelling of the face, rapid heart beat, nausea, dizziness, fever, chills, headache, difficulty breathing, swelling in the arms or legs, or have a prickly feeling of your skin, contact a health care provider immediately. Following this discussion, the knee was prepped with alcohol and under sterile condition the injection was performed through a superolateral injection site with a 20 gauge needle. The needle was introduced into the joint, aspiration was performed to ensure intra-articular placement and the medication was injected. Upon withdrawal of the needle the site was cleaned with alcohol and a band aid applied. The patient tolerated the injection well and there were no adverse effects. Post injection instructions included no strenuous activity for 24 hours, cryotherapy and if there are any adverse effects to contact the office.

## 2025-04-16 NOTE — PROCEDURE
[de-identified] :  I injected the patient's bilateral knee gelsyn inj#1 lot# [id number] exp 2027/05/31 Knee injection visco-supplementation: I discussed at length with the patient the planned steroid and lidocaine injection for primary osteoarthritis. The risks, benefits, convalescence and alternatives were reviewed and pt consented for injection. The possible side effects discussed included but were not limited to: pain, swelling, heat and redness. There symptoms are generally mild but if they are extensive then contact the office. Giving pain relievers by mouth such as NSAIDs or Tylenol can generally treat the reactions to injection. Rare cases of infection have been noted. Rash, hives and itching may occur post injection. If you have muscle pain or cramps, flushing and or swelling of the face, rapid heart beat, nausea, dizziness, fever, chills, headache, difficulty breathing, swelling in the arms or legs, or have a prickly feeling of your skin, contact a health care provider immediately. Following this discussion, the knee was prepped with alcohol and under sterile condition the injection was performed through a superolateral injection site with a 20 gauge needle. The needle was introduced into the joint, aspiration was performed to ensure intra-articular placement and the medication was injected. Upon withdrawal of the needle the site was cleaned with alcohol and a band aid applied. The patient tolerated the injection well and there were no adverse effects. Post injection instructions included no strenuous activity for 24 hours, cryotherapy and if there are any adverse effects to contact the office.

## 2025-04-16 NOTE — PHYSICAL EXAM
[UE/LE] : Sensory: Intact in bilateral upper & lower extremities [ALL] : dorsalis pedis, posterior tibial, femoral, popliteal, and radial 2+ and symmetric bilaterally [Poor Appearance] : well-appearing [Acute Distress] : not in acute distress [Normal] : Oriented to person, place, and time, insight and judgement were intact and the affect was normal

## 2025-04-23 NOTE — PROCEDURE
[de-identified] :  I injected the patient's *bilateral knee gelsyn inj#2 lot# [id number] exp 2027/05/31.  Knee injection visco-supplementation: I discussed at length with the patient the planned steroid and lidocaine injection for primary osteoarthritis. The risks, benefits, convalescence and alternatives were reviewed and pt consented for injection. The possible side effects discussed included but were not limited to: pain, swelling, heat and redness. There symptoms are generally mild but if they are extensive then contact the office. Giving pain relievers by mouth such as NSAIDs or Tylenol can generally treat the reactions to injection. Rare cases of infection have been noted. Rash, hives and itching may occur post injection. If you have muscle pain or cramps, flushing and or swelling of the face, rapid heart beat, nausea, dizziness, fever, chills, headache, difficulty breathing, swelling in the arms or legs, or have a prickly feeling of your skin, contact a health care provider immediately. Following this discussion, the knee was prepped with alcohol and under sterile condition the injection was performed through a superolateral injection site with a 20 gauge needle. The needle was introduced into the joint, aspiration was performed to ensure intra-articular placement and the medication was injected. Upon withdrawal of the needle the site was cleaned with alcohol and a band aid applied. The patient tolerated the injection well and there were no adverse effects. Post injection instructions included no strenuous activity for 24 hours, cryotherapy and if there are any adverse effects to contact the office.

## 2025-04-23 NOTE — DISCUSSION/SUMMARY
[de-identified] : patient had bilateral gelsyn 3 injection #2 today which she toleratled well follow up one week

## 2025-04-23 NOTE — REASON FOR VISIT
[Follow-Up Visit] : a follow-up visit for [FreeTextEntry2] : bilateral knee gelsyn inj#2 lot# [id number] exp 2027/05/31

## 2025-04-30 NOTE — REASON FOR VISIT
[Follow-Up Visit] : a follow-up visit for [FreeTextEntry2] : bilateral knee gelsyn inj#3 lot# [id number] exp 2027/05/31.

## 2025-04-30 NOTE — DISCUSSION/SUMMARY
[de-identified] : Medication risks reviewed. Surgical risks reviewed. 62 y/o F pt presents with bone on bone medial compartmental osteoarthritis of both knees. The nature of her condition and treatment options were discussed in detail. The pt is a reasonable candidate for a staged bilateral TKA. The pt understands that her A1c needs to be below 8.0 and she needs cardiac clearance. We recommend the pt exhausts conservative therapy treatment such as HA injections, cortisone injections, and antiinflammatories. she opted for 3/3 gelsyn bialteral knee injection today which she tolerated well. pt will f/u for cortisone injection as needed

## 2025-04-30 NOTE — PROCEDURE
[de-identified] : I injected the patient's bilateral knee gelsyn inj# 3 lot# [id number] exp 2027/05/31.  Knee injection visco-supplementation: I discussed at length with the patient the planned steroid and lidocaine injection for primary osteoarthritis. The risks, benefits, convalescence and alternatives were reviewed and pt consented for injection. The possible side effects discussed included but were not limited to: pain, swelling, heat and redness. There symptoms are generally mild but if they are extensive then contact the office. Giving pain relievers by mouth such as NSAIDs or Tylenol can generally treat the reactions to injection. Rare cases of infection have been noted. Rash, hives and itching may occur post injection. If you have muscle pain or cramps, flushing and or swelling of the face, rapid heart beat, nausea, dizziness, fever, chills, headache, difficulty breathing, swelling in the arms or legs, or have a prickly feeling of your skin, contact a health care provider immediately. Following this discussion, the knee was prepped with alcohol and under sterile condition the injection was performed through a superolateral injection site with a 20 gauge needle. The needle was introduced into the joint, aspiration was performed to ensure intra-articular placement and the medication was injected. Upon withdrawal of the needle the site was cleaned with alcohol and a band aid applied. The patient tolerated the injection well and there were no adverse effects. Post injection instructions included no strenuous activity for 24 hours, cryotherapy and if there are any adverse effects to contact the office.

## 2025-05-01 NOTE — PROCEDURE
[de-identified] : I injected the patient's bilateral knee today with cortisone for primary osteoarthritis. I discussed at length with the patient the planned steroid and lidocaine injection. The risks, benefits, convalescence and alternatives were reviewed. The possible side effects discussed included but were not limited to: pain, swelling, heat, bleeding, and redness. Symptoms are generally mild but if they are extensive then contact the office. Giving pain relievers by mouth such as NSAIDs or Tylenol can generally treat the reactions to steroid and lidocaine. Rare cases of infection have been noted. Rash, hives and itching may occur post injection. If you have muscle pain or cramps, flushing and or swelling of the face, rapid heart beat, nausea, dizziness, fever, chills, headache, difficulty breathing, swelling in the arms or legs, or have a prickly feeling of your skin, contact a health care provider immediately. Following this discussion, the knee was prepped with Alcohol and under sterile condition the 80 mg Depo-Medrol and 6 cc Lidocaine injection was performed with a 20 gauge needle through a superolateral injection site. The needle was introduced into the joint, aspiration was performed to ensure intra-articular placement and the medication was injected. Upon withdrawal of the needle the site was cleaned with alcohol and a band aid applied. The patient tolerated the injection well and there were no adverse effects. Post injection instructions included no strenuous activity for 24 hours, cryotherapy and if there are any adverse effects to contact the office.

## 2025-05-01 NOTE — DISCUSSION/SUMMARY
[de-identified] : Medication risks reviewed. Surgical risks reviewed. 60 y/o F pt presents with bone on bone medial compartmental osteoarthritis of both knees. The nature of her condition and treatment options were discussed in detail. The pt is a reasonable candidate for a staged bilateral TKA. The pt understands that her A1c needs to be below 8.0 and she needs cardiac clearance. We recommend the pt exhausts conservative therapy treatment such as HA injections, cortisone injections, and antiinflammatories.  She opted for repeat bilateral cortisone injections in the office today.  Follow-up 3 months

## 2025-05-01 NOTE — HISTORY OF PRESENT ILLNESS
[de-identified] : This is a 59-year-old morbidly obese female with longstanding bilateral knee pain she has known bone-on-bone medial compartment osteoarthritis of bilateral knees the x-rays worse on the left than right but she has about similar pain in both knees.  She completed course of gel injections and comes in today for bilateral cortisone injections prior to her summer trip Patient has history of DVT and diabetes

## 2025-05-01 NOTE — PHYSICAL EXAM
[de-identified] : GENERAL APPEARANCE: Well nourished and hydrated, pleasant, alert, and oriented x 3. Appears their stated age. HEENT: Normocephalic, extraocular eye motion intact. Nasal septum midline. Oral cavity clear. External auditory canal clear. RESPIRATORY: Breath sounds clear and audible in all lobes. No wheezing, No accessory muscle use. CARDIOVASCULAR: No apparent abnormalities. No lower leg edema. No varicosities. Pedal pulses are palpable. NEUROLOGIC: Sensation is normal, no muscle weakness in the upper or lower extremities. DERMATOLOGIC: No apparent skin lesions, moist, warm, no rash. SPINE: Cervical spine appears normal and moves freely; thoracic spine appears normal and moves freely; lumbosacral spine appears normal and moves freely, normal, nontender. MUSCULOSKELETAL: Hands, wrists, and elbows are normal and move freely, shoulders are normal and move freely. Musculoskeletal 5/5 motor strength in bilateral lower extremities. Sensory: Intact in bilateral lower extremities. DTRs: Biceps, brachioradialis, triceps, patellar, ankle and plantar 2+ and symmetric bilaterally. Pulses: dorsalis pedis, posterior tibial, femoral, popliteal, and radial 2+ and symmetric bilaterally. Constitutional: Alert and in no acute distress, but well-appearing.   Musculoskeletal:. Bilateral knee examination shows medial joint line tenderness range of motion is at 0 to 115 degree.

## 2025-07-14 NOTE — REVIEW OF SYSTEMS
[Negative] : Psychiatric [FreeTextEntry9] : +Joint pain, +Hand pain, +Trigger finger  [de-identified] : +Neuropathy

## 2025-07-14 NOTE — PHYSICAL EXAM
[FreeTextEntry1] : Gen: Patient is A&O x 3, NAD HEENT: EOMI, hearing grossly normal Resp: regular, non - labored CV: pulses regular Skin: no rashes, erythema Lymph: +Non-pitting edema in RUE and chest wall, no edema in LUE  Inspection: no instability or misalignment, impaired hand dexterity ROM: full throughout Palpation: triggering noted with finger flexion b/l hands, TTP right chest wall Sensation: decreased to light touch in bilateral hands and feet Reflexes: 2+ throughout  Strength: 4/5 strength throughout Special tests: -Ibanez sign Gait: difficulty walking tandem, antalgic, small step length, difficulty with tandem gait

## 2025-07-14 NOTE — ASSESSMENT
[FreeTextEntry1] : 61 year old female presenting for evaluation.  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, with underlying median neuropathy at the wrist as well -Continue gabapentin 1200mg TID-rx sent  -Medical cannabis previously ineffective   #Aromatase inhibitor induced arthralgias: -Presenting with joint pains in hands and trigger finger -Continue duloxetine 90mg daily-Rx sent   #Lymphedema in RUE -Continue pneumatic compression pump daily -Unable to perform bioimpedance spectroscopy of LUE due to bilateral risk, clinically monitor  -Continue self-MLD -Educated about lymphedema  -continue custom compression sleeve 20-30mmHg and glove   #Back pain: -Recommend Following with Neurosurgery, surveillance MRIs    Follow up in 3 months or sooner as needed.

## 2025-07-14 NOTE — HISTORY OF PRESENT ILLNESS
[FreeTextEntry1] : Ms. Wayne is a 60 year old female with history of bilateral breast cancer-right Stage IIB, ER positive/AL positive/HER-2 negative/left Stage IIA ER positive/AL negative/HER-2 positive.  She underwent bilateral mastectomies in October 2020 (1/6 sentinal notes on Right, 0/3 sentinel nodes on left)  S/P AC x 4, with Taxol course complicated by hypersensitivity reaction and uncontrolled DM exacerbated by steroids. S/P Abraxane completed in June 2021. S/P adjuvant radiation to right chest wall and nodes completed September 2021. Continuing with Herceptin/Perjeta, currently on exemestane.  Stopped anticoagulation.    She reports that she obtain compression sleeve.  Still using of her lymphedema reports stable.  No erythema or increased warmth.  Still has neuropathy symptoms as well as joint pains in her hands and knees.  Makes it difficult to perform some ADLs and fine motor tasks.  No overt weakness or bowel bladder dysfunction.  Continues on duloxetine gabapentin reports that help at the do make her a little bit tired.  Otherwise denies any major side effects.